# Patient Record
Sex: MALE | Race: WHITE | NOT HISPANIC OR LATINO
[De-identification: names, ages, dates, MRNs, and addresses within clinical notes are randomized per-mention and may not be internally consistent; named-entity substitution may affect disease eponyms.]

---

## 2020-03-11 ENCOUNTER — APPOINTMENT (OUTPATIENT)
Dept: PULMONOLOGY | Facility: CLINIC | Age: 45
End: 2020-03-11
Payer: COMMERCIAL

## 2020-03-11 VITALS
SYSTOLIC BLOOD PRESSURE: 136 MMHG | BODY MASS INDEX: 35.12 KG/M2 | OXYGEN SATURATION: 96 % | HEART RATE: 87 BPM | WEIGHT: 265 LBS | DIASTOLIC BLOOD PRESSURE: 96 MMHG | HEIGHT: 73 IN

## 2020-03-11 DIAGNOSIS — G43.709 CHRONIC MIGRAINE W/OUT AURA, NOT INTRACTABLE, W/OUT STATUS MIGRAINOSUS: ICD-10-CM

## 2020-03-11 DIAGNOSIS — Z80.0 FAMILY HISTORY OF MALIGNANT NEOPLASM OF DIGESTIVE ORGANS: ICD-10-CM

## 2020-03-11 DIAGNOSIS — Z80.49 FAMILY HISTORY OF MALIGNANT NEOPLASM OF OTHER GENITAL ORGANS: ICD-10-CM

## 2020-03-11 DIAGNOSIS — I10 ESSENTIAL (PRIMARY) HYPERTENSION: ICD-10-CM

## 2020-03-11 PROBLEM — Z00.00 ENCOUNTER FOR PREVENTIVE HEALTH EXAMINATION: Status: ACTIVE | Noted: 2020-03-11

## 2020-03-11 PROCEDURE — 94060 EVALUATION OF WHEEZING: CPT

## 2020-03-11 PROCEDURE — 94727 GAS DIL/WSHOT DETER LNG VOL: CPT

## 2020-03-11 PROCEDURE — 94729 DIFFUSING CAPACITY: CPT

## 2020-03-11 PROCEDURE — 99243 OFF/OP CNSLTJ NEW/EST LOW 30: CPT | Mod: 25

## 2020-03-11 RX ORDER — GABAPENTIN 300 MG/1
300 CAPSULE ORAL
Refills: 0 | Status: ACTIVE | COMMUNITY

## 2020-03-11 RX ORDER — AMLODIPINE BESYLATE 5 MG/1
5 TABLET ORAL
Refills: 0 | Status: ACTIVE | COMMUNITY

## 2020-03-11 NOTE — HISTORY OF PRESENT ILLNESS
[Obstructive Sleep Apnea] : obstructive sleep apnea [Awakes with Dry Mouth] : awakes with dry mouth [Awakes with Headache] : awakes with headache [Daytime Somnolence] : daytime somnolence [Frequent Nocturnal Awakening] : frequent nocturnal awakening [Recent  Weight Gain] : recent  weight gain [Snoring] : snoring [Tired while Driving] : tired while driving [Never] : never [TextBox_4] : THOMAS evaluation\par Frequent migraine-takes gabapentin\par \par

## 2020-03-11 NOTE — PHYSICAL EXAM
[No Acute Distress] : no acute distress [III] : Mallampati Class: III [3+] : Right Tonsil: 3+ [Normal Appearance] : normal appearance [No Neck Mass] : no neck mass [Normal Rate/Rhythm] : normal rate/rhythm [Normal S1, S2] : normal s1, s2 [No Murmurs] : no murmurs [No Resp Distress] : no resp distress [Clear to Auscultation Bilaterally] : clear to auscultation bilaterally [No Abnormalities] : no abnormalities [Benign] : benign [Normal Gait] : normal gait [No Clubbing] : no clubbing [No Cyanosis] : no cyanosis [No Edema] : no edema [FROM] : FROM [Normal Color/ Pigmentation] : normal color/ pigmentation [No Focal Deficits] : no focal deficits [Oriented x3] : oriented x3 [Normal Affect] : normal affect

## 2020-03-11 NOTE — ASSESSMENT
[FreeTextEntry1] : Based on history and physical the patient has a high likelihood of having obstructive sleep apnea. Further assessment by sleep testing is recommended. There is no contraindication to a home sleep study. We will therefore proceed to two night home apnea sleep study for further assessment.\par

## 2020-03-30 ENCOUNTER — APPOINTMENT (OUTPATIENT)
Dept: PULMONOLOGY | Facility: CLINIC | Age: 45
End: 2020-03-30
Payer: COMMERCIAL

## 2020-03-30 PROCEDURE — 95800 SLP STDY UNATTENDED: CPT

## 2020-03-31 PROCEDURE — 95800 SLP STDY UNATTENDED: CPT

## 2020-04-20 ENCOUNTER — APPOINTMENT (OUTPATIENT)
Dept: PULMONOLOGY | Facility: CLINIC | Age: 45
End: 2020-04-20
Payer: COMMERCIAL

## 2020-04-20 PROCEDURE — 99213 OFFICE O/P EST LOW 20 MIN: CPT | Mod: 95

## 2020-04-20 NOTE — ASSESSMENT
[FreeTextEntry1] : Results of sleep study reviewed with patient. Treatment options including weight loss oral appliance therapy and PAP therapy were reviewed with patient. After careful review of sleep study patient desire and risk factors recommend initial therapy with PAP. Will order auto titrating device.\par mask options reviewed, will  start with nasal mask\par Follow up for data download and compliance assessment.\par \par telehealth- 15 min incl charting and sleep study review\par

## 2020-04-20 NOTE — HISTORY OF PRESENT ILLNESS
[Medical Office: ([facility])___] : at the medical office located in  [Home] : at home, [unfilled] , at the time of the visit. [Patient] : the patient [TextBox_4] : Followup for sleep apnea, to review results of home sleep study.\par No change in history as reported on initial evaluation\par  [Self] : self

## 2020-12-23 ENCOUNTER — APPOINTMENT (OUTPATIENT)
Dept: PULMONOLOGY | Facility: CLINIC | Age: 45
End: 2020-12-23
Payer: COMMERCIAL

## 2020-12-23 VITALS
DIASTOLIC BLOOD PRESSURE: 90 MMHG | HEIGHT: 73 IN | BODY MASS INDEX: 33.8 KG/M2 | WEIGHT: 255 LBS | OXYGEN SATURATION: 96 % | SYSTOLIC BLOOD PRESSURE: 150 MMHG | HEART RATE: 75 BPM

## 2020-12-23 PROCEDURE — 99072 ADDL SUPL MATRL&STAF TM PHE: CPT

## 2020-12-23 PROCEDURE — 99213 OFFICE O/P EST LOW 20 MIN: CPT

## 2020-12-23 NOTE — PROCEDURE
[FreeTextEntry1] : Compliance Summary\par 7/15/2020 - 8/13/2020 (30 days)\par Days with Device Usage 29 days\par Days without Device Usage 1 day\par Percent Days with Device Usage 96.7%\par Cumulative Usage 9 days 12 hrs. 17 mins. 32 secs.\par Maximum Usage (1 Day) 10 hrs. 14 mins. 33 secs.\par Average Usage (All Days) 7 hrs. 36 mins. 35 secs.\par Average Usage (Days Used) 7 hrs. 52 mins. 19 secs.\par Minimum Usage (1 Day) 5 hrs. 43 mins. 40 secs.\par Percent of Days with Usage >= 4 Hours 96.7%\par Percent of Days with Usage < 4 Hours 3.3%\par Date Range\par Total Blower Time 9 days 12 hrs. 19 mins. 40 secs.\par Average AHI 0.8\par Auto-CPAP Summary\par Auto-CPAP Mean Pressure 10.2 cmH2O\par Auto-CPAP Peak Average Pressure 10.4 cmH2O\par Average Device Pressure <= 90% of Time 10.6 cmH2O\par Average Time in Large Leak Per Day 2 mins. 52 secs

## 2020-12-23 NOTE — ASSESSMENT
[FreeTextEntry1] : Patient is currently on treatment with PAP. Data download confirms excellent compliance. Patient continues to use treatment and is benefiting from it.\par Needs supplies\par

## 2020-12-23 NOTE — HISTORY OF PRESENT ILLNESS
[TextBox_4] : Here for THOMAS followup. Reports no current respiratory symptoms or sleep symptoms. Using PAP on a nightly basis without difficulty. Reports no symptoms of daytime sleepiness. Getting supplies regularly. \par \par Device: Dreamstation auto\par \par Vendor: [employer]\par \par Setting:auto 10-20\par \par Mask:\par \par Issues:not getting supplies\par Headaches got better with cpap,. then came back. He got his eyes checked and the headaches resolved. \par

## 2022-01-19 ENCOUNTER — APPOINTMENT (OUTPATIENT)
Dept: PULMONOLOGY | Facility: CLINIC | Age: 47
End: 2022-01-19
Payer: COMMERCIAL

## 2022-01-19 VITALS
HEIGHT: 78 IN | WEIGHT: 257 LBS | OXYGEN SATURATION: 96 % | DIASTOLIC BLOOD PRESSURE: 96 MMHG | HEART RATE: 77 BPM | SYSTOLIC BLOOD PRESSURE: 149 MMHG | BODY MASS INDEX: 29.73 KG/M2

## 2022-01-19 PROCEDURE — 99213 OFFICE O/P EST LOW 20 MIN: CPT

## 2022-01-19 NOTE — PROCEDURE
[FreeTextEntry1] : Compliance Summary\par 12/19/2021 - 1/17/2022 (30 days)\par Days with Device Usage 30 days\par Days without Device Usage 0 days\par Percent Days with Device Usage 100.0%\par Cumulative Usage 9 days 11 hrs. 35 mins. 2 secs.\par Maximum Usage (1 Day) 9 hrs. 28 mins. 30 secs.\par Average Usage (All Days) 7 hrs. 35 mins. 10 secs.\par Average Usage (Days Used) 7 hrs. 35 mins. 10 secs.\par Minimum Usage (1 Day) 6 hrs. 13 mins. 49 secs.\par Percent of Days with Usage >= 4 Hours 100.0%\par Percent of Days with Usage < 4 Hours 0.0%\par Date Range\par Total Blower Time 9 days 11 hrs. 35 mins. 16 secs.\par Average AHI 1.0\par Auto-CPAP Summary\par Auto-CPAP Mean Pressure 10.2 cmH2O\par Auto-CPAP Peak Average Pressure 11.1 cmH2O\par Device Pressure <= 90% of Time 11.0 cmH2O\par Average Time in Large Leak Per Day 1 mins. 12 secs

## 2022-01-19 NOTE — HISTORY OF PRESENT ILLNESS
[TextBox_4] : Here for THOMAS followup. Reports no current respiratory symptoms or sleep symptoms. Using PAP on a nightly basis without difficulty. Reports no symptoms of daytime sleepiness. Getting supplies regularly. \par \par Device: Youcruit 1\par \par Vendor: [employer]\par \par Setting: 10-20\par \par Mask:  nasal mask\par \par Issues: Recall\par

## 2022-01-19 NOTE — ASSESSMENT
[FreeTextEntry1] : Patient is currently on treatment with PAP. Data download confirms excellent compliance. Patient continues to use treatment and is benefiting from it.\par Patient was informed about the recall of Jj [employer]pir[employer]ran Self Storages CPAP machines.  Risks and benefits of continuing treatment versus interruption of treatment discussed.  Patient encouraged to go to the  website and to reach out to DME regarding device upgrade and/or replacement.\par For now we will continue to use device as risks of not using CPAP are too great\par \par

## 2023-01-18 ENCOUNTER — APPOINTMENT (OUTPATIENT)
Dept: PULMONOLOGY | Facility: CLINIC | Age: 48
End: 2023-01-18

## 2023-04-04 ENCOUNTER — APPOINTMENT (OUTPATIENT)
Dept: PULMONOLOGY | Facility: CLINIC | Age: 48
End: 2023-04-04
Payer: COMMERCIAL

## 2023-04-04 VITALS — SYSTOLIC BLOOD PRESSURE: 150 MMHG | DIASTOLIC BLOOD PRESSURE: 99 MMHG | HEART RATE: 85 BPM | OXYGEN SATURATION: 95 %

## 2023-04-04 DIAGNOSIS — G47.33 OBSTRUCTIVE SLEEP APNEA (ADULT) (PEDIATRIC): ICD-10-CM

## 2023-04-04 PROCEDURE — 99213 OFFICE O/P EST LOW 20 MIN: CPT

## 2023-04-05 PROBLEM — G47.33 OSA (OBSTRUCTIVE SLEEP APNEA): Status: ACTIVE | Noted: 2020-04-20

## 2023-04-05 NOTE — HISTORY OF PRESENT ILLNESS
[TextBox_4] : Follow-up for sleep apnea.  Using DreamStation 2 auto CPAP received as replacement from [employer] he has been using the device on a nightly basis however the humidifier is not working.  I tried changing the settings but there is actually an alert on the machine itself same humidifier is not working.  He is using the machine and benefiting from it.  I am not certain at this point who is responsible for replacing the humidifier which in case of the DreamStation 2 would likely mean replacing the entire device.  I asked the patient to reach out to [facility] to replacement department and see if they can give him any answers about this.  In the interim I will reach out to our staff regarding this issue

## 2024-01-21 ENCOUNTER — EMERGENCY (EMERGENCY)
Facility: HOSPITAL | Age: 49
LOS: 1 days | Discharge: HOME | End: 2024-01-21
Attending: EMERGENCY MEDICINE
Payer: COMMERCIAL

## 2024-01-21 VITALS
WEIGHT: 250 LBS | RESPIRATION RATE: 18 BRPM | DIASTOLIC BLOOD PRESSURE: 102 MMHG | TEMPERATURE: 98 F | SYSTOLIC BLOOD PRESSURE: 160 MMHG | OXYGEN SATURATION: 96 % | HEIGHT: 73 IN | HEART RATE: 82 BPM

## 2024-01-21 VITALS
SYSTOLIC BLOOD PRESSURE: 142 MMHG | DIASTOLIC BLOOD PRESSURE: 95 MMHG | HEART RATE: 69 BPM | OXYGEN SATURATION: 97 % | TEMPERATURE: 98 F | RESPIRATION RATE: 20 BRPM

## 2024-01-21 LAB
ALBUMIN SERPL ELPH-MCNC: 4.5 G/DL — SIGNIFICANT CHANGE UP (ref 3.3–5)
ALP SERPL-CCNC: 127 U/L — HIGH (ref 40–120)
ALT FLD-CCNC: 56 U/L — HIGH (ref 10–45)
ANION GAP SERPL CALC-SCNC: 13 MMOL/L — SIGNIFICANT CHANGE UP (ref 5–17)
AST SERPL-CCNC: 29 U/L — SIGNIFICANT CHANGE UP (ref 10–40)
BASOPHILS # BLD AUTO: 0.06 K/UL — SIGNIFICANT CHANGE UP (ref 0–0.2)
BASOPHILS NFR BLD AUTO: 0.8 % — SIGNIFICANT CHANGE UP (ref 0–2)
BILIRUB SERPL-MCNC: 0.4 MG/DL — SIGNIFICANT CHANGE UP (ref 0.2–1.2)
BUN SERPL-MCNC: 13 MG/DL — SIGNIFICANT CHANGE UP (ref 7–23)
CALCIUM SERPL-MCNC: 8.8 MG/DL — SIGNIFICANT CHANGE UP (ref 8.4–10.5)
CHLORIDE SERPL-SCNC: 104 MMOL/L — SIGNIFICANT CHANGE UP (ref 96–108)
CO2 SERPL-SCNC: 23 MMOL/L — SIGNIFICANT CHANGE UP (ref 22–31)
CREAT SERPL-MCNC: 0.67 MG/DL — SIGNIFICANT CHANGE UP (ref 0.5–1.3)
EGFR: 115 ML/MIN/1.73M2 — SIGNIFICANT CHANGE UP
EOSINOPHIL # BLD AUTO: 0.18 K/UL — SIGNIFICANT CHANGE UP (ref 0–0.5)
EOSINOPHIL NFR BLD AUTO: 2.4 % — SIGNIFICANT CHANGE UP (ref 0–6)
GLUCOSE SERPL-MCNC: 132 MG/DL — HIGH (ref 70–99)
HCT VFR BLD CALC: 48.6 % — SIGNIFICANT CHANGE UP (ref 39–50)
HGB BLD-MCNC: 16.4 G/DL — SIGNIFICANT CHANGE UP (ref 13–17)
IMM GRANULOCYTES NFR BLD AUTO: 0.5 % — SIGNIFICANT CHANGE UP (ref 0–0.9)
LYMPHOCYTES # BLD AUTO: 2.62 K/UL — SIGNIFICANT CHANGE UP (ref 1–3.3)
LYMPHOCYTES # BLD AUTO: 34.7 % — SIGNIFICANT CHANGE UP (ref 13–44)
MCHC RBC-ENTMCNC: 28.9 PG — SIGNIFICANT CHANGE UP (ref 27–34)
MCHC RBC-ENTMCNC: 33.7 GM/DL — SIGNIFICANT CHANGE UP (ref 32–36)
MCV RBC AUTO: 85.7 FL — SIGNIFICANT CHANGE UP (ref 80–100)
MONOCYTES # BLD AUTO: 0.48 K/UL — SIGNIFICANT CHANGE UP (ref 0–0.9)
MONOCYTES NFR BLD AUTO: 6.3 % — SIGNIFICANT CHANGE UP (ref 2–14)
NEUTROPHILS # BLD AUTO: 4.18 K/UL — SIGNIFICANT CHANGE UP (ref 1.8–7.4)
NEUTROPHILS NFR BLD AUTO: 55.3 % — SIGNIFICANT CHANGE UP (ref 43–77)
NRBC # BLD: 0 /100 WBCS — SIGNIFICANT CHANGE UP (ref 0–0)
PLATELET # BLD AUTO: 262 K/UL — SIGNIFICANT CHANGE UP (ref 150–400)
POTASSIUM SERPL-MCNC: 3.7 MMOL/L — SIGNIFICANT CHANGE UP (ref 3.5–5.3)
POTASSIUM SERPL-SCNC: 3.7 MMOL/L — SIGNIFICANT CHANGE UP (ref 3.5–5.3)
PROT SERPL-MCNC: 7.1 G/DL — SIGNIFICANT CHANGE UP (ref 6–8.3)
RBC # BLD: 5.67 M/UL — SIGNIFICANT CHANGE UP (ref 4.2–5.8)
RBC # FLD: 12.2 % — SIGNIFICANT CHANGE UP (ref 10.3–14.5)
SODIUM SERPL-SCNC: 140 MMOL/L — SIGNIFICANT CHANGE UP (ref 135–145)
WBC # BLD: 7.56 K/UL — SIGNIFICANT CHANGE UP (ref 3.8–10.5)
WBC # FLD AUTO: 7.56 K/UL — SIGNIFICANT CHANGE UP (ref 3.8–10.5)

## 2024-01-21 PROCEDURE — 86618 LYME DISEASE ANTIBODY: CPT

## 2024-01-21 PROCEDURE — 99283 EMERGENCY DEPT VISIT LOW MDM: CPT

## 2024-01-21 PROCEDURE — 80053 COMPREHEN METABOLIC PANEL: CPT

## 2024-01-21 PROCEDURE — 99284 EMERGENCY DEPT VISIT MOD MDM: CPT

## 2024-01-21 PROCEDURE — 82962 GLUCOSE BLOOD TEST: CPT

## 2024-01-21 PROCEDURE — 85025 COMPLETE CBC W/AUTO DIFF WBC: CPT

## 2024-01-21 RX ORDER — VALACYCLOVIR 500 MG/1
1000 TABLET, FILM COATED ORAL ONCE
Refills: 0 | Status: COMPLETED | OUTPATIENT
Start: 2024-01-21 | End: 2024-01-21

## 2024-01-21 RX ORDER — VALACYCLOVIR 500 MG/1
1 TABLET, FILM COATED ORAL
Qty: 18 | Refills: 0
Start: 2024-01-21 | End: 2024-01-26

## 2024-01-21 RX ORDER — VALACYCLOVIR 500 MG/1
1 TABLET, FILM COATED ORAL
Qty: 21 | Refills: 0
Start: 2024-01-21 | End: 2024-01-27

## 2024-01-21 RX ADMIN — Medication 60 MILLIGRAM(S): at 10:41

## 2024-01-21 RX ADMIN — VALACYCLOVIR 1000 MILLIGRAM(S): 500 TABLET, FILM COATED ORAL at 10:41

## 2024-01-21 NOTE — ED PROVIDER NOTE - PROGRESS NOTE DETAILS
Mariela Chambers MD (PGY3): Labs nonactionable.  Pending Lyme serology.   Patient feels well at this time.  EKG without acute findings.  patient passed dysphagia screening by his taking an oral meds.  Sent prescription to pharmacy.  Will provide eye protection, discussed neuro follow-up and strict return precautions.

## 2024-01-21 NOTE — ED PROVIDER NOTE - PHYSICAL EXAMINATION
General: Alert and Orientated x 3. No apparent distress.  Head: Normocephalic and atraumatic.  Eyes: L eyelid unable to fully close,  PERRLA with EOMI.  Neck: Supple. Trachea midline.   Cardiac: Normal S1 and S2 w/ RRR. No murmurs appreciated. No JVD appreciated.  Pulmonary: CTA bilaterally. No increased WOB. No wheezes or crackles.  Abdominal: Soft, non-tender. (+) bowel sounds appreciated in all 4 quadrants. No hepatosplenomegaly.   Neurologic: L facial nerve pasly w/ LMN and UMN involvement otherwise CN grossly intact. no dysmentia.   Musculoskeletal: Strength appropriate in all 4 extremities for age with no limited ROM.  Skin: Color appropriate for race. Intact, warm, and well-perfused.  no rashes.   Psychiatric: Appropriate mood and affect. No apparent risk to self or others. General: Alert and Orientated x 3. No apparent distress.  Head: Normocephalic and atraumatic.  Eyes: R eyelid unable to fully close,  PERRLA with EOMI.  Neck: Supple. Trachea midline.   Cardiac: Normal S1 and S2 w/ RRR. No murmurs appreciated. No JVD appreciated.  Pulmonary: CTA bilaterally. No increased WOB. No wheezes or crackles.  Abdominal: Soft, non-tender. (+) bowel sounds appreciated in all 4 quadrants. No hepatosplenomegaly.   Neurologic: R facial nerve pasly w/ LMN and UMN involvement otherwise CN grossly intact. no dysmentia.   Musculoskeletal: Strength appropriate in all 4 extremities for age with no limited ROM.  Skin: Color appropriate for race. Intact, warm, and well-perfused.  no rashes.   Psychiatric: Appropriate mood and affect. No apparent risk to self or others.

## 2024-01-21 NOTE — ED PROVIDER NOTE - OBJECTIVE STATEMENT
Patient is 47 y/o M w/ no sig pmhx presenting to the Ed with facial droop. Went to bed last night. Woke up at 8AM with L facial numbness and noticed L facial droop with difficulty closing L eye. No recent viral illness, ear pain, skin lesions, travel, bug bites. No hx of stoke/TIA. No HA, vision changes, falls, trauma, nausea, vomiting, extremity weakness or numbness. able to ambulate. No new meds. Patient evaluated by neuro and ed team, decision made to cancel code stroke. Patient is 47 y/o M w/ no sig pmhx presenting to the Ed with facial droop. Went to bed last night. Woke up at 8AM with R facial numbness and noticed R facial droop with difficulty closing R eye. No recent viral illness, ear pain, skin lesions, travel, bug bites. No hx of stoke/TIA. No HA, vision changes, falls, trauma, nausea, vomiting, extremity weakness or numbness. able to ambulate. No new meds. Patient evaluated by neuro and ed team, decision made to cancel code stroke.

## 2024-01-21 NOTE — ED PROVIDER NOTE - NSFOLLOWUPINSTRUCTIONS_ED_ALL_ED_FT
[place] Palsy, Adult        A prescription for prednisone 60 mg once a day for 6 days and valacyclovir 1000 mg  3 times a day for 7 days was sent to pharmacy.  You received a dose in the emergency department.    Please follow with the Neurologist within 14 days.    Bell palsy is a short-term inability to move muscles in part of the face. The inability to move (paralysis) results from inflammation or compression of the facial nerve, which travels along the skull and under the ear to the side of the face (7th cranial nerve). This nerve is responsible for facial movements that include blinking, closing the eyes, smiling, and frowning.    What are the causes?  The exact cause of this condition is not known. It may be caused by an infection from a virus, such as the chickenpox (herpes zoster), Corie-Barr, or mumps virus.    What increases the risk?  You are more likely to develop this condition if:    You are pregnant.  You have diabetes.  You have had a recent infection in your nose, throat, or airways (upper respiratory infection).  You have a weakened body defense system (immune system).  You have had a facial injury, such as a fracture.  You have a family history of Bell palsy.    What are the signs or symptoms?  Symptoms of this condition include:    Weakness on one side of the face.  Drooping eyelid and corner of the mouth.  Excessive tearing in one eye.  Difficulty closing the eyelid.  Dry eye.  Drooling.  Dry mouth.  Changes in taste.  Change in facial appearance.  Pain behind one ear.  Ringing in one or both ears.  Sensitivity to sound in one ear.  Facial twitching.  Headache.  Impaired speech.  Dizziness.  Difficulty eating or drinking.    Most of the time, only one side of the face is affected. Rarely, Bell palsy affects the whole face.    How is this diagnosed?  This condition is diagnosed based on:    Your symptoms.  Your medical history.  A physical exam.    You may also have to see health care providers who specialize in disorders of the nerves (neurologist) or diseases and conditions of the eye (ophthalmologist). You may have tests, such as:    A test to check for nerve damage (electromyogram).  Imaging studies, such as CT or MRI scans.  Blood tests.    How is this treated?  This condition affects every person differently. Sometimes symptoms go away without treatment within a couple weeks. If treatment is needed, it varies from person to person. The goal of treatment is to reduce inflammation and protect the eye from damage. Treatment for Bell palsy may include:    Medicines, such as:    Steroids to reduce swelling and inflammation.  Antiviral drugs.  Pain relievers, including aspirin, acetaminophen, or ibuprofen.  Eye drops or ointment to keep your eye moist.  Eye protection, if you cannot close your eye.  Exercises or massage to regain muscle strength and function (physical therapy).    Follow these instructions at home:     Take over-the-counter and prescription medicines only as told by your health care provider.  If your eye is affected:    Keep your eye moist with eye drops or ointment as told by your health care provider.  Follow instructions for eye care and protection as told by your health care provider.  Do any physical therapy exercises as told by your health care provider.  Keep all follow-up visits as told by your health care provider. This is important.    Contact a health care provider if:  You have a fever.  Your symptoms do not get better within 2–3 weeks, or your symptoms get worse.  Your eye is red, irritated, or painful.  You have new symptoms.    Get help right away if:  You have weakness or numbness in a part of your body other than your face.  You have trouble swallowing.  You develop neck pain or stiffness.  You develop dizziness or shortness of breath.    Summary  Bell palsy is a short-term inability to move muscles in part of the face. The inability to move (paralysis) results from inflammation or compression of the facial nerve.  This condition affects every person differently. Sometimes symptoms go away without treatment within a couple weeks.  If treatment is needed, it varies from person to person. The goal of treatment is to reduce inflammation and protect the eye from damage.  Contact your health care provider if your symptoms do not get better within 2–3 weeks, or your symptoms get worse.    ADDITIONAL NOTES AND INSTRUCTIONS    Please follow up with your Primary MD in 24-48 hr.  Seek immediate medical care for any new/worsening signs or symptoms.

## 2024-01-21 NOTE — ED PROVIDER NOTE - CLINICAL SUMMARY MEDICAL DECISION MAKING FREE TEXT BOX
48-year-old male with no significant past medical history who presents to ED with 48-year-old male with no significant past medical history who presents to ED with   With right facial droop and inability to close right eyelid that he noticed this morning around 8 AM.  Went to sleep without issue.  No focal weakness, headache, vision changes.  No travel, tick bites, ear infections, ear pain, URI symptoms, rashes.  No for facial trauma or head trauma.  No fevers or chills.  Able to ambulate.  No blood thinners.  Code stroke canceled.  Vital stable.  Exam with right facial nerve palsy with no obvious trauma to the face, cranial nerve 2-12 grossly intact otherwise, no dysmetria, gait normal, no abdominal tense palpation, lungs clear to auscultation.  Likely Bell's palsy.  Will give prednisone and valacyclovir.  Will get basic labs and Lyme   Serology.  Discussed care precautions as well as neuro follow-up.

## 2024-01-21 NOTE — ED PROVIDER NOTE - CARE PROVIDER_API CALL
Shukri Paulino  Neurology  [address], [address]  [place] [address]  Phone: [phone]  Fax: [phone]  Follow Up Time:

## 2024-01-21 NOTE — ED ADULT NURSE NOTE - NSFALLUNIVINTERV_ED_ALL_ED
Bed/Stretcher in lowest position, wheels locked, appropriate side rails in place/Call bell, personal items and telephone in reach/Instruct patient to call for assistance before getting out of bed/chair/stretcher/Non-slip footwear applied when patient is off stretcher/[place] to call system/Physically safe environment - no spills, clutter or unnecessary equipment/Purposeful proactive rounding/Room/bathroom lighting operational, light cord in reach
room air

## 2024-01-21 NOTE — ED ADULT NURSE NOTE - OBJECTIVE STATEMENT
48 y.o M BIB self p/w c/o facial numbness. A+Ox4. Pt states went out last night w/ friends and had about 3-4 beers, went home and went to bed at midnight w/ no issues. States woke up this AM at 0800 and reports a "weird numbing" sensation on R side of face. Had his morning coffee and endorses dribbling of liquid down side of R mouth. Reports looking in mirror, seeing R side off face drooping, called MD and was told to come to hospital for further eval. Upon initial assessment, no control of R eye, unable to blink. R facial droop noted. Denies R sided weakness/numbness/tingling  of RUE/RLE. Able to ambulate at baseline. Drove self to hospital. Denies any recent infection or tics. PMH HTN and migraines. No other complaints at this time, safety maintained.

## 2024-01-21 NOTE — ED ADULT TRIAGE NOTE - CHIEF COMPLAINT QUOTE
R side facial numbness  and facial droop L side since this 8 AM R side facial numbness and R side facial droop since this 8 AM

## 2024-01-21 NOTE — ED ADULT NURSE NOTE - CHPI ED NUR CONTEXT2
Notification Instructions: Patient will be notified of biopsy results. However, patient instructed to call the office if not contacted within 2 weeks. unknown

## 2024-01-21 NOTE — ED PROVIDER NOTE - PATIENT PORTAL LINK FT
You can access the FollowMyHealth Patient Portal offered by [facility] by registering at the following website: [email or web address]. By joining [facility]’s FollowMyHealth portal, you will also be able to view your health information using other applications (apps) compatible with our system.

## 2024-01-21 NOTE — ED PROVIDER NOTE - ATTENDING CONTRIBUTION TO CARE
.-year-old male with no significant past medical history presenting with facial droop went to bed normal last night and woke up at 8 AM feeling right side of the face to be heavy noticed in regular to have a facial droop and difficulty closing the eye no recent viral illness no ear pain no skin lesions no travel and no bug bites no history of prior strokes or TIAs denies any headache any vision loss fall trauma nausea vomiting  Code stroke was called and after consultation with stroke team who was present decided to cancel the code given high likelihood of peripheral involvement  Physical examination is consistent with 7th cranial nerve palsy otherwise nonfocal  Given this we will started valacyclovir and steroids I will have follow-up with neurology and eye guarding in the usual care for 7 the cranial palsy

## 2024-01-23 LAB
B BURGDOR C6 AB SER-ACNC: NEGATIVE — SIGNIFICANT CHANGE UP
B BURGDOR IGG+IGM SER-ACNC: 0.21 INDEX — SIGNIFICANT CHANGE UP (ref 0.01–0.89)

## 2024-02-27 ENCOUNTER — APPOINTMENT (OUTPATIENT)
Dept: NEUROLOGY | Facility: CLINIC | Age: 49
End: 2024-02-27
Payer: COMMERCIAL

## 2024-02-27 VITALS
HEART RATE: 76 BPM | HEIGHT: 78 IN | SYSTOLIC BLOOD PRESSURE: 125 MMHG | DIASTOLIC BLOOD PRESSURE: 89 MMHG | BODY MASS INDEX: 29.73 KG/M2 | WEIGHT: 257 LBS

## 2024-02-27 PROCEDURE — 99204 OFFICE O/P NEW MOD 45 MIN: CPT

## 2024-02-27 NOTE — ASSESSMENT
[FreeTextEntry1] : Mr. Goodman is a 48-year-old with history of cervical surgery and hypertension.  In late January, he awoke with a severe right facial palsy.  His presentation and diagnostic evaluation are consistent with an idiopathic or Bell's palsy.  He was appropriately treated with valacyclovir and prednisone.  I explained that full recovery typically occurs in 90% of patients.He should continue appropriate eye care with lubricating ointment and eye protection when outdoors.  I suggested avoiding taping his lids closed due to risk of corneal damage.  He will return to the office in 6 to 8 weeks for routine follow-up.  Telephone contact will be maintained in the meantime.

## 2024-02-27 NOTE — PHYSICAL EXAM
[FreeTextEntry1] : Constitutional:  Patient was well-developed, well-nourished and in no acute distress.   Head:  Normocephalic, atraumatic. Tympanic membranes were clear.   Neck:  Supple with full range of motion.   Cardiovascular:  Cardiac rhythm was regular without murmur. There were no carotid bruits. Peripheral pulses were full and symmetric.   Respiratory:  Lungs were clear.   Abdomen:  Soft and nontender.   Spine:  Nontender.   Skin:  There were no rashes.   NEUROLOGICAL EXAMINATION:  Mental Status: Patient was alert and oriented. Speech was fluent. There was no dysarthria.   Cranial Nerves:   II: Visual acuity was 20/25 in the right eye and 20/20 in the left eye with glasses and near card. Pupils were equal and reactive. Visual fields were full. Funduscopic examination was normal.   III, IV, VI:  Eye movements were full without nystagmus.   V: Facial sensation was intact.   VII: Left facial strength was intact.  There is complete right facial palsy except for mild ability to close his right eyelid and move his platysma.  There was mild tenderness on palpation of the infra auricular area.  VIII: Hearing was equal.   IX, X: Palatal movement was normal. Phonation was normal.   XI: Sternocleidomastoids and trapezii were normal.   XII: Tongue was midline and movements normal. There was no lingual atrophy or fasciculations.   Motor Examination: Muscle bulk, tone and strength were normal.   Sensory Examination: Pinprick, vibration and joint position sense were intact.   Reflexes: DTRs were 2+ throughout except for the brachioradialis which were absent.   Plantar Responses: Plantar responses were flexor.   Coordination/Cerebellar Function: There was no dysmetria on finger to nose or heel to shin testing.   Gait/Stance: Gait and tandem were normal. He mildly swayed on Romberg testing.

## 2024-02-27 NOTE — CONSULT LETTER
[Dear  ___] : Dear  [unfilled], [Consult Letter:] : I had the pleasure of evaluating your patient, [unfilled]. [Please see my note below.] : Please see my note below. [Consult Closing:] : Thank you very much for allowing me to participate in the care of this patient.  If you have any questions, please do not hesitate to contact me. [Sincerely,] : Sincerely, [FreeTextEntry3] : Manny Zamora M.D.

## 2024-02-27 NOTE — HISTORY OF PRESENT ILLNESS
[FreeTextEntry1] :  Mr. aDvion Goodman is a 48-year-old left-handed gentleman who was referred for neurologic evaluation at your kind suggestion.  Mr. Goodman was in his usual state of health until January 21, 2024 when he awoke with severe right facial weakness and numbness.  When he drank his coffee, it drooled out of his right side of his mouth.  He experienced discomfort behind his right ear.  There was no cutaneous eruption on the face, external auditory canal or pinna.  He was evaluated in the emergency room at [facility].  A serum Lyme titer was negative.  He underwent an MRI of the brain at [facility] which revealed mild enhancement of the right facial nerve in the temporal bone images.  He was treated with valacyclovir and prednisone.  He is taking gabapentin 300 mg at bedtime.  His right retroauricular pain has improved.  He still has persistent severe weakness of his right face.  He is using lubricating ointment for his eye and taping his eye closed at night.  He denies any change in taste, smell or hearing.  Past surgical history is notable for an anterior cervical discectomy and fusion from C5-C7, right total knee replacement and left shoulder labrum repair.  He suffers from hypertension.  There is no history of diabetes, hyperlipidemia, cardiac, pulmonary, renal, hepatic, gastrointestinal, thyroid, hematologic or cerebrovascular disease.  He has a long history of episodic headaches typically occipital and frontal with occasional nausea without photophobia or triggers.  These began in his late 50s and resolved several years ago after beginning metoprolol.  He has no allergies.  Medications include metoprolol ER 50 mg daily, rizatriptan as needed, amlodipine 10 mg/day and gabapentin 300 mg at bedtime.  He is  and works in an administrative position.  He is a former smoker and social drinker.  Family history is notable for a child with cerebral palsy and hydrocephalus and another child with ADHD.  His mother suffers from cervical and colon cancer and melanoma.  His father was a diabetic.

## 2024-04-22 ENCOUNTER — APPOINTMENT (OUTPATIENT)
Dept: ULTRASOUND IMAGING | Facility: CLINIC | Age: 49
End: 2024-04-22
Payer: COMMERCIAL

## 2024-04-22 PROCEDURE — 76700 US EXAM ABDOM COMPLETE: CPT

## 2024-04-29 ENCOUNTER — NON-APPOINTMENT (OUTPATIENT)
Age: 49
End: 2024-04-29

## 2024-04-30 ENCOUNTER — APPOINTMENT (OUTPATIENT)
Dept: NEUROLOGY | Facility: CLINIC | Age: 49
End: 2024-04-30
Payer: COMMERCIAL

## 2024-04-30 VITALS
SYSTOLIC BLOOD PRESSURE: 141 MMHG | WEIGHT: 260 LBS | HEIGHT: 78 IN | BODY MASS INDEX: 30.08 KG/M2 | DIASTOLIC BLOOD PRESSURE: 84 MMHG | HEART RATE: 77 BPM

## 2024-04-30 DIAGNOSIS — G51.0 BELL'S PALSY: ICD-10-CM

## 2024-04-30 PROCEDURE — 99213 OFFICE O/P EST LOW 20 MIN: CPT

## 2024-04-30 NOTE — HISTORY OF PRESENT ILLNESS
[FreeTextEntry1] :  Mr. Davion Goodman returned to the office having been initially evaluated on February 27, 2024.  He is a 89-year-old left-handed gentleman who was in his usual state of health until January 21, 2024 when he awoke with severe right facial weakness and numbness.  When he drank his coffee, it drooled out of his right side of his mouth.  He experienced discomfort behind his right ear.  There was no cutaneous eruption on the face, external auditory canal or pinna.  He was evaluated in the emergency room at [facility].  A serum Lyme titer was negative.  He underwent an MRI of the brain at [facility] which revealed mild enhancement of the right facial nerve in the temporal bone images.  He was treated with valacyclovir and prednisone.  He was taking gabapentin 300 mg at bedtime.  His right retroauricular pain had improved.  He still had persistent severe weakness of his right face.  He was using lubricating ointment for his eye and taping his eye closed at night.  He denied any change in taste, smell or hearing.  Since his last visit, he reports resolution of retroauricular pain.  He reports increased ability to move his nasalis and cheek muscles.  There is mild movement in his lateral orbicularis area.  He complains of occasional right eye tearing.  He denies aberrations of taste or smell.  He is using lubricating ointment in his right eye at night and an eye patch.  Past surgical history is notable for an anterior cervical discectomy and fusion from C5-C7, right total knee replacement and left shoulder labrum repair.  He suffers from hypertension.  There is no history of diabetes, hyperlipidemia, cardiac, pulmonary, renal, hepatic, gastrointestinal, thyroid, hematologic or cerebrovascular disease.  He has a long history of episodic headaches typically occipital and frontal with occasional nausea without photophobia or triggers.  These began in his late 50s and resolved several years ago after beginning metoprolol.  He has no allergies.  Medications include metoprolol ER 50 mg daily, rizatriptan as needed and amlodipine 10 mg/day.  He is  and works in an administrative position.  He is a former smoker and social drinker.  Family history is notable for a child with cerebral palsy and hydrocephalus and another child with ADHD.  His mother suffers from cervical and colon cancer and melanoma.  His father was a diabetic.

## 2024-04-30 NOTE — PHYSICAL EXAM
[FreeTextEntry1] : Constitutional:  Patient was well-developed, well-nourished and in no acute distress.   Head:  Normocephalic, atraumatic. Tympanic membranes were clear.   Neck:  Supple with full range of motion.   Cardiovascular:  Cardiac rhythm was regular without murmur. There were no carotid bruits. Peripheral pulses were full and symmetric.   Respiratory:  Lungs were clear.   Abdomen:  Soft and nontender.   Spine:  Nontender.   Skin:  There were no rashes.   NEUROLOGICAL EXAMINATION:  Mental Status: Patient was alert and oriented. Speech was fluent. There was no dysarthria.   Cranial Nerves:   II: He could finger count bilaterally. Pupils were equal and reactive. Visual fields were full. Funduscopic examination was normal.   III, IV, VI:  Eye movements were full without nystagmus.   V: Facial sensation was intact.   VII: Left facial strength was intact.  There was weak right eye closure, some movement of his right platysma, nasalis and possibly temporalis.  There was no movement of his frontalis, right orbicularis or perioral muscles.  VIII: Hearing was equal.   IX, X: Palatal movement was normal. Phonation was normal.   XI: Sternocleidomastoids and trapezii were normal.   XII: Tongue was midline and movements normal. There was no lingual atrophy or fasciculations.   Motor Examination: Muscle bulk, tone and strength were normal.   Sensory Examination: Pinprick, vibration and joint position sense were intact.   Reflexes: DTRs were 2+ throughout except for the brachioradialis which were absent.   Plantar Responses: Plantar responses were flexor.   Coordination/Cerebellar Function: There was no dysmetria on finger to nose or heel to shin testing.   Gait/Stance: Gait and tandem were normal.  Romberg was negative.

## 2024-04-30 NOTE — ASSESSMENT
[FreeTextEntry1] : Mr. Goodman is a 49-year-old with history of cervical surgery and hypertension.  It is now 3 months since he suffered a rather severe right Bell's palsy.  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  Recovery may be incomplete.  I suggested continued eye care.  He will continue therapy.  He will return to the office in 3 to 4 months for routine follow-up.  Telephone contact will be maintained in the meantime.

## 2024-08-12 PROBLEM — K82.4 GALLBLADDER POLYP: Status: ACTIVE | Noted: 2024-08-12

## 2024-08-12 NOTE — REASON FOR VISIT
[Initial Consultation] : an initial consultation for [Referred By: ___] : Referred By: [unfilled] [FreeTextEntry2] : gallbladder polyp

## 2024-08-12 NOTE — ASSESSMENT
[FreeTextEntry1] : 49M with 0.8 cm gallbladder polyp noted on abdominal ultrasound 4/22/2024 for abdominal pain.   Plan:

## 2024-08-12 NOTE — END OF VISIT
[FreeTextEntry3] : By signing my name below, I, Mejose miguelcharlie Rahman, attest that this documentation has been prepared under the direction and in the presence of Veronica Donald MD in the following sections HISTORY OF PRESENT ILLNESS; PAST MEDICAL/FAMILY/SOCIAL HISTORY; REVIEW OF SYSTEMS; PHYSICAL EXAM; ASSESSMENT/PLAN. 
<-- Click to add NO significant Past Surgical History

## 2024-08-12 NOTE — HISTORY OF PRESENT ILLNESS
[de-identified] : Mr. OMAR SAHA is a 49-year-old male who presents for initial consultation regarding gallbladder polyp. Referred by Dr. William Childs.  Patient initially presented to Dr. Childs c/o abdominal pain. Underwent workup with abdominal ultrasound on 04/22/2024 which demonstrated an adherent 0.8 x 0.6 cm echogenic focus within the gallbladder, possibly representing gallbladder polyp. No evidence of gallbladder wall thickening or pericholecystic fluid, no evidence of acute cholecystitis. Also noted were hepatomegaly and hepatic steatosis with area of focal fatty sparing. Pancreas was poorly visualized due to bowel gas.  PMHx: HTN, THOMAS, right-sided Bell's palsy, chronic migraines PSHx: spinal surgery Meds: amlodipine, gabapentin FMHx: mother w/ cervical cancer and colon cancer Social:  Patient presents for initial consultation.

## 2024-08-12 NOTE — PHYSICAL EXAM
[FreeTextEntry1] : General: No acute distress. Well nourished and well kempt. Head: AT/NC Eyes: PERRL. EOMI. Pulmonary: No respiratory distress. Trach intact. ABD: Soft. NT/ND. No rebound, no guarding, no rigidity. No peritoneal signs. No masses. Extremities: Warm. No edema. Neurological: A&O x 4. Psychiatric: Normal affect and mood.

## 2024-08-13 ENCOUNTER — APPOINTMENT (OUTPATIENT)
Dept: SURGICAL ONCOLOGY | Facility: CLINIC | Age: 49
End: 2024-08-13

## 2024-08-13 DIAGNOSIS — K82.4 CHOLESTEROLOSIS OF GALLBLADDER: ICD-10-CM

## 2024-08-21 ENCOUNTER — APPOINTMENT (OUTPATIENT)
Dept: NEUROLOGY | Facility: CLINIC | Age: 49
End: 2024-08-21

## 2024-11-17 PROBLEM — M51.360 DEGENERATION OF INTERVERTEBRAL DISC OF LUMBAR REGION WITH DISCOGENIC BACK PAIN: Status: ACTIVE | Noted: 2024-11-17

## 2024-11-17 PROBLEM — M62.830 SPASM OF LUMBAR PARASPINOUS MUSCLE: Status: ACTIVE | Noted: 2024-11-17

## 2025-01-20 ENCOUNTER — APPOINTMENT (OUTPATIENT)
Dept: ULTRASOUND IMAGING | Facility: CLINIC | Age: 50
End: 2025-01-20
Payer: COMMERCIAL

## 2025-01-20 PROCEDURE — 76700 US EXAM ABDOM COMPLETE: CPT

## 2025-02-11 ENCOUNTER — APPOINTMENT (OUTPATIENT)
Dept: SURGICAL ONCOLOGY | Facility: CLINIC | Age: 50
End: 2025-02-11
Payer: COMMERCIAL

## 2025-02-11 VITALS
WEIGHT: 265 LBS | SYSTOLIC BLOOD PRESSURE: 143 MMHG | BODY MASS INDEX: 35.12 KG/M2 | HEART RATE: 73 BPM | HEIGHT: 73 IN | DIASTOLIC BLOOD PRESSURE: 86 MMHG | OXYGEN SATURATION: 97 %

## 2025-02-11 PROCEDURE — 99203 OFFICE O/P NEW LOW 30 MIN: CPT

## 2025-02-13 ENCOUNTER — APPOINTMENT (OUTPATIENT)
Dept: NEUROLOGY | Facility: CLINIC | Age: 50
End: 2025-02-13

## 2025-02-13 ENCOUNTER — APPOINTMENT (OUTPATIENT)
Dept: NEUROLOGY | Facility: CLINIC | Age: 50
End: 2025-02-13
Payer: COMMERCIAL

## 2025-02-13 VITALS
BODY MASS INDEX: 35.12 KG/M2 | SYSTOLIC BLOOD PRESSURE: 132 MMHG | DIASTOLIC BLOOD PRESSURE: 76 MMHG | HEART RATE: 66 BPM | HEIGHT: 73 IN | WEIGHT: 265 LBS

## 2025-02-13 DIAGNOSIS — G51.0 BELL'S PALSY: ICD-10-CM

## 2025-02-13 PROCEDURE — 99213 OFFICE O/P EST LOW 20 MIN: CPT

## 2025-03-05 ENCOUNTER — APPOINTMENT (OUTPATIENT)
Dept: INTERNAL MEDICINE | Facility: CLINIC | Age: 50
End: 2025-03-05